# Patient Record
Sex: FEMALE | URBAN - METROPOLITAN AREA
[De-identification: names, ages, dates, MRNs, and addresses within clinical notes are randomized per-mention and may not be internally consistent; named-entity substitution may affect disease eponyms.]

---

## 2022-12-30 ENCOUNTER — APPOINTMENT (RX ONLY)
Dept: URBAN - METROPOLITAN AREA CLINIC 28 | Facility: CLINIC | Age: 32
Setting detail: DERMATOLOGY
End: 2022-12-30

## 2022-12-30 DIAGNOSIS — L40.0 PSORIASIS VULGARIS: ICD-10-CM | Status: INADEQUATELY CONTROLLED

## 2022-12-30 PROCEDURE — ? PRESCRIPTION MEDICATION MANAGEMENT

## 2022-12-30 PROCEDURE — 99204 OFFICE O/P NEW MOD 45 MIN: CPT

## 2022-12-30 PROCEDURE — ? RECOMMENDATIONS

## 2022-12-30 PROCEDURE — ? MEDICATION COUNSELING

## 2022-12-30 PROCEDURE — ? COUNSELING

## 2022-12-30 PROCEDURE — ? PRESCRIPTION

## 2022-12-30 RX ORDER — KETOCONAZOLE 20 MG/ML
SHAMPOO, SUSPENSION TOPICAL QDAY
Qty: 120 | Refills: 3 | Status: ERX | COMMUNITY
Start: 2022-12-30

## 2022-12-30 RX ORDER — BETAMETHASONE DIPROPIONATE 0.5 MG/ML
LOTION TOPICAL QHS
Qty: 60 | Refills: 3 | Status: ERX | COMMUNITY
Start: 2022-12-30

## 2022-12-30 RX ORDER — HYDROCORTISONE 25 MG/G
CREAM TOPICAL BID
Qty: 453.6 | Refills: 3 | Status: ERX | COMMUNITY
Start: 2022-12-30

## 2022-12-30 RX ORDER — HYDROQUINONE 4 %
CREAM (GRAM) TOPICAL BID
Qty: 30 | Refills: 2 | Status: ERX | COMMUNITY
Start: 2022-12-30

## 2022-12-30 RX ADMIN — KETOCONAZOLE: 20 SHAMPOO, SUSPENSION TOPICAL at 00:00

## 2022-12-30 RX ADMIN — HYDROCORTISONE: 25 CREAM TOPICAL at 00:00

## 2022-12-30 RX ADMIN — Medication: at 00:00

## 2022-12-30 RX ADMIN — BETAMETHASONE DIPROPIONATE: 0.5 LOTION TOPICAL at 00:00

## 2022-12-30 ASSESSMENT — LOCATION ZONE DERM
LOCATION ZONE: SCALP
LOCATION ZONE: LEG
LOCATION ZONE: FACE

## 2022-12-30 ASSESSMENT — LOCATION SIMPLE DESCRIPTION DERM
LOCATION SIMPLE: LEFT CHEEK
LOCATION SIMPLE: RIGHT THIGH
LOCATION SIMPLE: ANTERIOR SCALP
LOCATION SIMPLE: LEFT THIGH

## 2022-12-30 ASSESSMENT — LOCATION DETAILED DESCRIPTION DERM
LOCATION DETAILED: LEFT INFERIOR CENTRAL MALAR CHEEK
LOCATION DETAILED: MID-FRONTAL SCALP
LOCATION DETAILED: LEFT ANTERIOR DISTAL THIGH
LOCATION DETAILED: RIGHT ANTERIOR DISTAL THIGH

## 2022-12-30 NOTE — HPI: PSORIASIS (PATIENT REPORTED)
Do You Have A Family History Of Psoriasis?: no
Where Is Your Psoriasis Located?: Thighs , scalp and face
Additional History: Diagnosed

## 2022-12-30 NOTE — PROCEDURE: RECOMMENDATIONS
Recommendations (Free Text): increase hair washing from every other week to weekly
Detail Level: Zone
Render Risk Assessment In Note?: no
Recommendation Preamble: The following recommendations were made during the visit:

## 2022-12-30 NOTE — PROCEDURE: PRESCRIPTION MEDICATION MANAGEMENT
Detail Level: Zone
Initiate Treatment: betamethasone dipropionate 0.05 % lotion: Apply a few scattered drops to scalp QHS\\nketoconazole 2 % shampoo: Apply a thin layer to scalp QDAY, let sit for 3-5 minutes then rinse\\nhydrocortisone 2.5 % topical cream: Apply a thin layer BID to AA of body until clear and then use as needed\\nhydroquinone 4 % topical cream: Apply a thin layer BID to dark spots X 3 months and then take a break
Samples Given: Amlactin
Render In Strict Bullet Format?: No

## 2023-11-30 NOTE — PROCEDURE: MEDICATION COUNSELING
[facility] phone call message- patient requesting results:    Test: LAB    Date of test: 11/17/2023    Additional Comments: Please give pt a call with results     OK to leave a message on voice mail? Yes    Primary language: English      needed? No    Call taken on November 30, 2023 at 11:48 AM by Grisel Flores-Cardona    [place] Counseling: I discussed with the patient the risks of tildrakizumab including but not limited to immunosuppression, malignancy, posterior leukoencephalopathy syndrome, and serious infections.  The patient understands that monitoring is required including a PPD at baseline and must alert us or the primary physician if symptoms of infection or other concerning signs are noted.

## 2024-02-15 ENCOUNTER — HOSPITAL ENCOUNTER (EMERGENCY)
Facility: HOSPITAL | Age: 34
Discharge: HOME | End: 2024-02-15
Attending: EMERGENCY MEDICINE
Payer: COMMERCIAL

## 2024-02-15 VITALS
RESPIRATION RATE: 16 BRPM | DIASTOLIC BLOOD PRESSURE: 73 MMHG | BODY MASS INDEX: 30.36 KG/M2 | HEIGHT: 62 IN | WEIGHT: 165 LBS | TEMPERATURE: 97.5 F | SYSTOLIC BLOOD PRESSURE: 122 MMHG | OXYGEN SATURATION: 99 % | HEART RATE: 78 BPM

## 2024-02-15 DIAGNOSIS — R55 NEAR SYNCOPE: Primary | ICD-10-CM

## 2024-02-15 LAB
GLUCOSE BLD-MCNC: 122 MG/DL (ref 70–99)
POCT TEST: ABNORMAL

## 2024-02-15 PROCEDURE — 99281 EMR DPT VST MAYX REQ PHY/QHP: CPT

## 2024-02-15 ASSESSMENT — ENCOUNTER SYMPTOMS
HEADACHES: 0
SHORTNESS OF BREATH: 0
NAUSEA: 1
DIZZINESS: 1
PALPITATIONS: 0
LIGHT-HEADEDNESS: 1
FEVER: 0
FATIGUE: 0
VOMITING: 0
DIARRHEA: 0
VISUAL CHANGE: 1
BLOOD IN STOOL: 0

## 2024-02-15 NOTE — ED NOTES
Left ED ambulatory without gait disturbance to go to [facility] to meet instructor     Tarah Panda, RN  02/15/24 1928

## 2024-02-15 NOTE — Clinical Note
Lexii Patton was seen and treated in our emergency department on 2/15/2024.  Lexii Patton may return to work on 02/15/2024.       If you have any questions or concerns, please don't hesitate to call.      Lise Knight PA

## 2024-02-15 NOTE — Clinical Note
Lexii Patton was seen and treated in our emergency department on 2/15/2024.  Lexii Patton may return to school on 02/15/2024.      If you have any questions or concerns, please don't hesitate to call.      Lise Knight PA

## 2024-02-15 NOTE — DISCHARGE INSTRUCTIONS
Your vital signs are stable and your blood sugar is normal.  Make sure to eat and drink prior to coming to your clinical rotations.  Stay hydrated.  Follow-up with your employer.  Return to the emergency department for any worsening symptoms.

## 2024-02-15 NOTE — ED PROVIDER NOTES
Emergency Medicine Note  HPI   HISTORY OF PRESENT ILLNESS     33-year-old female with a past history of diabetes, lost 50 pounds and is currently modifying her diet to manage the diabetes presents to the emergency department after having a near syncopal episode.  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  She states she had ringing in her ear and felt her vision changing a bit.  She told the nurse with her and sat down and put her head between her legs, had something to drink and some crackers.  She states she feels much improved now.  She still feels a bit nauseous.  She did not eat or drink anything prior to coming to school today.  She denies recent illness.  She denies chance of pregnancy.      History provided by:  Patient   used: No    Dizziness  Quality:  Lightheadedness  Severity:  Mild  Onset quality:  Sudden  Progression:  Resolved  Chronicity:  New  Context: standing up    Relieved by:  Lying down  Associated symptoms: nausea, tinnitus and vision changes    Associated symptoms: no blood in stool, no chest pain, no diarrhea, no headaches, no palpitations, no shortness of breath and no vomiting          Patient History   PAST HISTORY     Reviewed from Nursing Triage:       History reviewed. No pertinent past medical history.    History reviewed. No pertinent surgical history.    History reviewed. No pertinent family history.    Social History     Tobacco Use   • Smoking status: Never   • Smokeless tobacco: Never   Substance Use Topics   • Alcohol use: Never         Review of Systems   REVIEW OF SYSTEMS     Review of Systems   Constitutional: Negative for fatigue and fever.   HENT: Positive for tinnitus.    Respiratory: Negative for shortness of breath.    Cardiovascular: Negative for chest pain and palpitations.   Gastrointestinal: Positive for nausea. Negative for blood in stool, diarrhea and vomiting.    Neurological: Positive for dizziness and light-headedness. Negative for headaches.   All other systems reviewed and are negative.        VITALS     ED Vitals    Date/Time Temp Pulse Resp BP SpO2 [facility]   02/15/24 0921 36.7 °C (98 °F) 68 20 136/84 100 % MMM        Pulse Ox %: 100 % (02/15/24 0933)  Pulse Ox Interpretation: Normal (02/15/24 0933)           Physical Exam   PHYSICAL EXAM     Physical Exam  Vitals and nursing note reviewed.   Constitutional:       Appearance: Normal appearance.   HENT:      Head: Normocephalic and atraumatic.      Mouth/Throat:      Mouth: Mucous membranes are moist.      Pharynx: Oropharynx is clear.   Eyes:      Extraocular Movements: Extraocular movements intact.      Conjunctiva/sclera: Conjunctivae normal.      Pupils: Pupils are equal, round, and reactive to light.   Cardiovascular:      Rate and Rhythm: Normal rate and regular rhythm.      Pulses: Normal pulses.      Heart sounds: Normal heart sounds.   Pulmonary:      Effort: Pulmonary effort is normal.      Breath sounds: Normal breath sounds.   Musculoskeletal:         General: Normal range of motion.   Skin:     General: Skin is warm and dry.      Capillary Refill: Capillary refill takes less than 2 seconds.   Neurological:      General: No focal deficit present.      Mental Status: She is alert and oriented to person, place, and time.   Psychiatric:         Mood and Affect: Mood normal.           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  Symptoms improved  with drinking fluids and eating crackers.  She had some mild nausea.  Blood glucose stable.  Vital signs normal.  Advised to follow-up with the Workmen's Comp. doctor.  No further workup necessary at this time.  Return for worsening symptoms.  Make sure to eat and drink before coming into school.        ED Course as of 02/15/24 1009   u Feb 15, 2024   0944 Vital signs are stable, blood glucose 122.  Patient eating and drinking [CB]      ED Course User Index  [CB] Lise Knight PA     Clinical Impression      Near syncope     _________________     ED Disposition   Discharge                   Lise Knight PA  02/15/24 1009

## 2024-02-15 NOTE — ED ATTESTATION NOTE
The patient was evaluated and managed by the physician assistant / nurse practitioner.  I agree.  I discussed the case with the PA who saw and evaluated the patient.    Labs Reviewed   POCT GLUCOSE (BEAKER) - Abnormal       Result Value    POCT Bedside Glucose 122 (*)     POC Test POC            Say Rodriguez MD  02/15/24 5475